# Patient Record
Sex: FEMALE | Race: WHITE | NOT HISPANIC OR LATINO | URBAN - METROPOLITAN AREA
[De-identification: names, ages, dates, MRNs, and addresses within clinical notes are randomized per-mention and may not be internally consistent; named-entity substitution may affect disease eponyms.]

---

## 2024-03-22 PROBLEM — Z00.00 ENCOUNTER FOR PREVENTIVE HEALTH EXAMINATION: Status: ACTIVE | Noted: 2024-03-22

## 2024-03-26 ENCOUNTER — OUTPATIENT (OUTPATIENT)
Dept: OUTPATIENT SERVICES | Facility: HOSPITAL | Age: 64
LOS: 1 days | End: 2024-03-26

## 2024-03-26 VITALS
RESPIRATION RATE: 14 BRPM | HEIGHT: 70 IN | TEMPERATURE: 97 F | WEIGHT: 212.08 LBS | DIASTOLIC BLOOD PRESSURE: 84 MMHG | SYSTOLIC BLOOD PRESSURE: 140 MMHG | OXYGEN SATURATION: 98 % | HEART RATE: 68 BPM

## 2024-03-26 DIAGNOSIS — Z41.1 ENCOUNTER FOR COSMETIC SURGERY: ICD-10-CM

## 2024-03-26 DIAGNOSIS — N62 HYPERTROPHY OF BREAST: ICD-10-CM

## 2024-03-26 DIAGNOSIS — Z90.49 ACQUIRED ABSENCE OF OTHER SPECIFIED PARTS OF DIGESTIVE TRACT: Chronic | ICD-10-CM

## 2024-03-26 RX ORDER — SODIUM CHLORIDE 9 MG/ML
1000 INJECTION, SOLUTION INTRAVENOUS
Refills: 0 | Status: DISCONTINUED | OUTPATIENT
Start: 2024-03-29 | End: 2024-03-30

## 2024-03-26 RX ORDER — DOCUSATE SODIUM 100 MG
1 CAPSULE ORAL
Refills: 0 | DISCHARGE

## 2024-03-26 NOTE — H&P PST ADULT - PROBLEM SELECTOR PLAN 1
**** Panniculus and hypertrophy of breast  Patient is tentatively scheduled for breast reduction, abdominoplasty with Dr Jay 03/29/24.    Pre-op instructions provided. Pt given verbal and written instructions with teach back on chlorhexidine wash and pepcid. Pt verbalized understanding with return demonstration.      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   requested surgeons office to fax CBC,BMP results to [facility].

## 2024-03-26 NOTE — H&P PST ADULT - GASTROINTESTINAL COMMENTS
pre op diagnosis panniculus patient reports due to excess of lower abdominal skin- plan for abdominoplasty

## 2024-03-26 NOTE — H&P PST ADULT - GENITOURINARY COMMENTS
pre op hypertrophy of breast reports she had her pre op blood works and urine tests done in an urgent care - [place]- prescribed Macrobid and completed it 03/18/24. asymptomatic now

## 2024-03-26 NOTE — H&P PST ADULT - NSICDXPASTMEDICALHX_GEN_ALL_CORE_FT
PAST MEDICAL HISTORY:  Gall bladder stones     History of pancreatitis     Urinary tract infection

## 2024-03-26 NOTE — H&P PST ADULT - NSICDXFAMILYHX_GEN_ALL_CORE_FT
FAMILY HISTORY:  Mother  Still living? Unknown  Family history of cardiomyopathy, Age at diagnosis: Age Unknown    Sibling  Still living? Unknown  Family history of cardiomyopathy, Age at diagnosis: Age Unknown

## 2024-03-26 NOTE — H&P PST ADULT - HISTORY OF PRESENT ILLNESS
63 year old female, presents to [facility], with pre op diagnosis of  hypertrophy of breast and panniculus, for pre op evaluation prior to scheduled surgery- breast reduction, abdominoplasty with Dr Jay.

## 2024-03-26 NOTE — H&P PST ADULT - RESPIRATORY
clear to auscultation bilaterally/no wheezes/no rales/no rhonchi/no respiratory distress/airway patent

## 2024-03-26 NOTE — H&P PST ADULT - ADDITIONAL PE
denies loose teeth or dentures- veneers- present- upper front area  complete visualization of uvula- class 2- mallampati

## 2024-03-28 NOTE — ASU PATIENT PROFILE, ADULT - TEACHING/LEARNING CULTURAL CONSIDERATIONS
Contacted patient with results. Verbalized understanding. No questions at this time. Script sent to the pharmacy.    none

## 2024-03-28 NOTE — ASU PATIENT PROFILE, ADULT - FALL HARM RISK - UNIVERSAL INTERVENTIONS
Bed in lowest position, wheels locked, appropriate side rails in place/Call bell, personal items and telephone in reach/Instruct patient to call for assistance before getting out of bed or chair/Non-slip footwear when patient is out of bed/[place] to call system/Physically safe environment - no spills, clutter or unnecessary equipment/Purposeful Proactive Rounding/Room/bathroom lighting operational, light cord in reach

## 2024-03-29 ENCOUNTER — INPATIENT (INPATIENT)
Facility: HOSPITAL | Age: 64
LOS: 0 days | Discharge: HOME | End: 2024-03-30
Attending: SURGERY | Admitting: SURGERY
Payer: COMMERCIAL

## 2024-03-29 ENCOUNTER — RESULT REVIEW (OUTPATIENT)
Age: 64
End: 2024-03-29

## 2024-03-29 VITALS
TEMPERATURE: 98 F | SYSTOLIC BLOOD PRESSURE: 148 MMHG | HEART RATE: 62 BPM | WEIGHT: 212.08 LBS | HEIGHT: 70 IN | OXYGEN SATURATION: 99 % | RESPIRATION RATE: 62 BRPM

## 2024-03-29 DIAGNOSIS — Z41.1 ENCOUNTER FOR COSMETIC SURGERY: ICD-10-CM

## 2024-03-29 DIAGNOSIS — Z90.49 ACQUIRED ABSENCE OF OTHER SPECIFIED PARTS OF DIGESTIVE TRACT: Chronic | ICD-10-CM

## 2024-03-29 PROCEDURE — 88305 TISSUE EXAM BY PATHOLOGIST: CPT | Mod: 26

## 2024-03-29 PROCEDURE — 19318 BREAST REDUCTION: CPT | Mod: 50

## 2024-03-29 PROCEDURE — 15830 EXC EXCESSIVE SKIN ABDOMEN: CPT

## 2024-03-29 RX ORDER — ALBUMIN HUMAN 25 %
250 VIAL (ML) INTRAVENOUS ONCE
Refills: 0 | Status: COMPLETED | OUTPATIENT
Start: 2024-03-29 | End: 2024-03-29

## 2024-03-29 RX ORDER — SENNA PLUS 8.6 MG/1
2 TABLET ORAL AT BEDTIME
Refills: 0 | Status: DISCONTINUED | OUTPATIENT
Start: 2024-03-29 | End: 2024-03-30

## 2024-03-29 RX ORDER — TRAMADOL HYDROCHLORIDE 50 MG/1
25 TABLET ORAL ONCE
Refills: 0 | Status: DISCONTINUED | OUTPATIENT
Start: 2024-03-29 | End: 2024-03-29

## 2024-03-29 RX ORDER — ONDANSETRON 8 MG/1
4 TABLET, FILM COATED ORAL ONCE
Refills: 0 | Status: DISCONTINUED | OUTPATIENT
Start: 2024-03-29 | End: 2024-03-30

## 2024-03-29 RX ORDER — HYDROMORPHONE HYDROCHLORIDE 2 MG/ML
0.5 INJECTION INTRAMUSCULAR; INTRAVENOUS; SUBCUTANEOUS
Refills: 0 | Status: DISCONTINUED | OUTPATIENT
Start: 2024-03-29 | End: 2024-03-30

## 2024-03-29 RX ORDER — ACETAMINOPHEN 500 MG
1000 TABLET ORAL EVERY 6 HOURS
Refills: 0 | Status: DISCONTINUED | OUTPATIENT
Start: 2024-03-29 | End: 2024-03-30

## 2024-03-29 RX ORDER — METOCLOPRAMIDE HCL 10 MG
10 TABLET ORAL EVERY 8 HOURS
Refills: 0 | Status: DISCONTINUED | OUTPATIENT
Start: 2024-03-29 | End: 2024-03-30

## 2024-03-29 RX ORDER — CEFAZOLIN SODIUM 1 G
2000 VIAL (EA) INJECTION EVERY 8 HOURS
Refills: 0 | Status: DISCONTINUED | OUTPATIENT
Start: 2024-03-30 | End: 2024-03-30

## 2024-03-29 RX ORDER — OXYCODONE HYDROCHLORIDE 5 MG/1
10 TABLET ORAL EVERY 4 HOURS
Refills: 0 | Status: DISCONTINUED | OUTPATIENT
Start: 2024-03-29 | End: 2024-03-30

## 2024-03-29 RX ORDER — OXYCODONE HYDROCHLORIDE 5 MG/1
5 TABLET ORAL EVERY 4 HOURS
Refills: 0 | Status: DISCONTINUED | OUTPATIENT
Start: 2024-03-29 | End: 2024-03-30

## 2024-03-29 RX ORDER — PHENYLEPHRINE HYDROCHLORIDE 10 MG/ML
1 INJECTION INTRAVENOUS
Qty: 40 | Refills: 0 | Status: DISCONTINUED | OUTPATIENT
Start: 2024-03-29 | End: 2024-03-30

## 2024-03-29 RX ORDER — OXYCODONE HYDROCHLORIDE 5 MG/1
5 TABLET ORAL ONCE
Refills: 0 | Status: DISCONTINUED | OUTPATIENT
Start: 2024-03-29 | End: 2024-03-30

## 2024-03-29 RX ORDER — DIPHENHYDRAMINE HCL 50 MG
25 CAPSULE ORAL EVERY 4 HOURS
Refills: 0 | Status: DISCONTINUED | OUTPATIENT
Start: 2024-03-29 | End: 2024-03-30

## 2024-03-29 RX ORDER — ACETAMINOPHEN 500 MG
1000 TABLET ORAL ONCE
Refills: 0 | Status: DISCONTINUED | OUTPATIENT
Start: 2024-03-29 | End: 2024-03-30

## 2024-03-29 RX ORDER — ACETAMINOPHEN 500 MG
975 TABLET ORAL EVERY 8 HOURS
Refills: 0 | Status: DISCONTINUED | OUTPATIENT
Start: 2024-03-29 | End: 2024-03-30

## 2024-03-29 RX ORDER — ACETAMINOPHEN 500 MG
1000 TABLET ORAL ONCE
Refills: 0 | Status: COMPLETED | OUTPATIENT
Start: 2024-03-29 | End: 2024-03-29

## 2024-03-29 RX ORDER — ONDANSETRON 8 MG/1
4 TABLET, FILM COATED ORAL EVERY 6 HOURS
Refills: 0 | Status: DISCONTINUED | OUTPATIENT
Start: 2024-03-29 | End: 2024-03-30

## 2024-03-29 RX ORDER — HYDRALAZINE HCL 50 MG
10 TABLET ORAL EVERY 4 HOURS
Refills: 0 | Status: DISCONTINUED | OUTPATIENT
Start: 2024-03-29 | End: 2024-03-30

## 2024-03-29 RX ORDER — CALCIUM CARBONATE 500(1250)
1 TABLET ORAL EVERY 4 HOURS
Refills: 0 | Status: DISCONTINUED | OUTPATIENT
Start: 2024-03-29 | End: 2024-03-30

## 2024-03-29 RX ORDER — LANOLIN ALCOHOL/MO/W.PET/CERES
3 CREAM (GRAM) TOPICAL AT BEDTIME
Refills: 0 | Status: DISCONTINUED | OUTPATIENT
Start: 2024-03-29 | End: 2024-03-30

## 2024-03-29 RX ORDER — HYDROMORPHONE HYDROCHLORIDE 2 MG/ML
0.25 INJECTION INTRAMUSCULAR; INTRAVENOUS; SUBCUTANEOUS
Refills: 0 | Status: DISCONTINUED | OUTPATIENT
Start: 2024-03-29 | End: 2024-03-30

## 2024-03-29 RX ORDER — DIAZEPAM 5 MG
5 TABLET ORAL EVERY 8 HOURS
Refills: 0 | Status: DISCONTINUED | OUTPATIENT
Start: 2024-03-29 | End: 2024-03-30

## 2024-03-29 RX ADMIN — PHENYLEPHRINE HYDROCHLORIDE 36.1 MICROGRAM(S)/KG/MIN: 10 INJECTION INTRAVENOUS at 20:25

## 2024-03-29 RX ADMIN — Medication 400 MILLIGRAM(S): at 23:11

## 2024-03-29 RX ADMIN — TRAMADOL HYDROCHLORIDE 25 MILLIGRAM(S): 50 TABLET ORAL at 22:15

## 2024-03-29 RX ADMIN — TRAMADOL HYDROCHLORIDE 25 MILLIGRAM(S): 50 TABLET ORAL at 21:47

## 2024-03-29 RX ADMIN — Medication 125 MILLILITER(S): at 20:15

## 2024-03-29 RX ADMIN — Medication 1000 MILLIGRAM(S): at 23:59

## 2024-03-29 RX ADMIN — SENNA PLUS 2 TABLET(S): 8.6 TABLET ORAL at 21:48

## 2024-03-29 NOTE — ASU PREOP CHECKLIST - 1.
How Severe Is Your Acne?: moderate Is This A New Presentation, Or A Follow-Up?: Acne Females Only: When Was Your Last Menstrual Period?: 05/10/201 [facility]

## 2024-03-30 ENCOUNTER — TRANSCRIPTION ENCOUNTER (OUTPATIENT)
Age: 64
End: 2024-03-30

## 2024-03-30 VITALS
TEMPERATURE: 99 F | OXYGEN SATURATION: 97 % | SYSTOLIC BLOOD PRESSURE: 117 MMHG | RESPIRATION RATE: 20 BRPM | DIASTOLIC BLOOD PRESSURE: 57 MMHG | HEART RATE: 74 BPM

## 2024-03-30 LAB
HCT VFR BLD CALC: 30.8 % — LOW (ref 34.5–45)
HGB BLD-MCNC: 10.4 G/DL — LOW (ref 11.5–15.5)
MCHC RBC-ENTMCNC: 29.9 PG — SIGNIFICANT CHANGE UP (ref 27–34)
MCHC RBC-ENTMCNC: 33.8 GM/DL — SIGNIFICANT CHANGE UP (ref 32–36)
MCV RBC AUTO: 88.5 FL — SIGNIFICANT CHANGE UP (ref 80–100)
NRBC # BLD: 0 /100 WBCS — SIGNIFICANT CHANGE UP (ref 0–0)
NRBC # FLD: 0 K/UL — SIGNIFICANT CHANGE UP (ref 0–0)
PLATELET # BLD AUTO: 188 K/UL — SIGNIFICANT CHANGE UP (ref 150–400)
RBC # BLD: 3.48 M/UL — LOW (ref 3.8–5.2)
RBC # FLD: 12.3 % — SIGNIFICANT CHANGE UP (ref 10.3–14.5)
WBC # BLD: 14.82 K/UL — HIGH (ref 3.8–10.5)
WBC # FLD AUTO: 14.82 K/UL — HIGH (ref 3.8–10.5)

## 2024-03-30 RX ORDER — INFLUENZA VIRUS VACCINE 15; 15; 15; 15 UG/.5ML; UG/.5ML; UG/.5ML; UG/.5ML
0.5 SUSPENSION INTRAMUSCULAR ONCE
Refills: 0 | Status: DISCONTINUED | OUTPATIENT
Start: 2024-03-30 | End: 2024-03-30

## 2024-03-30 RX ORDER — SODIUM CHLORIDE 9 MG/ML
500 INJECTION, SOLUTION INTRAVENOUS ONCE
Refills: 0 | Status: COMPLETED | OUTPATIENT
Start: 2024-03-30 | End: 2024-03-30

## 2024-03-30 RX ADMIN — Medication 400 MILLIGRAM(S): at 06:20

## 2024-03-30 RX ADMIN — Medication 1000 MILLIGRAM(S): at 14:00

## 2024-03-30 RX ADMIN — Medication 100 MILLIGRAM(S): at 01:01

## 2024-03-30 RX ADMIN — Medication 100 MILLIGRAM(S): at 09:20

## 2024-03-30 RX ADMIN — Medication 100 MILLIGRAM(S): at 17:07

## 2024-03-30 RX ADMIN — SODIUM CHLORIDE 1000 MILLILITER(S): 9 INJECTION, SOLUTION INTRAVENOUS at 06:34

## 2024-03-30 RX ADMIN — SODIUM CHLORIDE 1000 MILLILITER(S): 9 INJECTION, SOLUTION INTRAVENOUS at 02:06

## 2024-03-30 RX ADMIN — Medication 400 MILLIGRAM(S): at 18:16

## 2024-03-30 RX ADMIN — Medication 400 MILLIGRAM(S): at 13:14

## 2024-03-30 NOTE — BRIEF OPERATIVE NOTE - NSICDXBRIEFPROCEDURE_GEN_ALL_CORE_FT
PROCEDURES:  Breast reduction 30-Mar-2024 07:15:01  Candice Jovel  Abdominoplasty, with liposuction if indicated 30-Mar-2024 07:15:44  Candice Jovel

## 2024-03-30 NOTE — BRIEF OPERATIVE NOTE - OPERATION/FINDINGS
bilateral breast reduction with inferior pedicle, abdominoplasty with undermining to xiphoid- no plication, flank liposuction with tumescent fluid

## 2024-03-30 NOTE — CHART NOTE - NSCHARTNOTEFT_GEN_A_CORE
Pt is a 63 year old female presenting today  for b/L breast reduction and abdominoplasty. Intraop and post op course c/b hypotension requiring phenylephrine gtt. Pt received 2L LR, albumin5% 500 cc  with inability to wean to meet goal -140. AS per anesthesia, pt to remain in PACU overnight with attempt to wean pressor by AM. No SICU consult and  no labs desired at this time. I/O and drains to be closely measured.     ICU Vital Signs Last 24 Hrs  T(C): 36.5 (30 Mar 2024 00:00), Max: 36.9 (29 Mar 2024 19:45)  T(F): 97.7 (30 Mar 2024 00:00), Max: 98.4 (29 Mar 2024 19:45)  HR: 81 (30 Mar 2024 00:00) (62 - 93)  BP: 119/71 (30 Mar 2024 00:00) (91/60 - 148/-)  BP(mean): 82 (30 Mar 2024 00:00) (60 - 95)  ABP: --  ABP(mean): --  RR: 14 (30 Mar 2024 00:00) (12 - 62)  SpO2: 93% (30 Mar 2024 00:00) (93% - 99%)    O2 Parameters below as of 30 Mar 2024 00:00  Patient On (Oxygen Delivery Method): room air

## 2024-03-30 NOTE — DISCHARGE NOTE PROVIDER - NSDCCPCAREPLAN_GEN_ALL_CORE_FT
PRINCIPAL DISCHARGE DIAGNOSIS  Diagnosis: Hypertrophy of breast  Assessment and Plan of Treatment:

## 2024-03-30 NOTE — DISCHARGE NOTE NURSING/CASE MANAGEMENT/SOCIAL WORK - NSDCPNINST_GEN_ALL_CORE
Notify MD if experiencing fever, nausea, vomiting, increased pain, bleeding, drainage or odor from incision. Empty drains and record output.

## 2024-03-30 NOTE — DISCHARGE NOTE PROVIDER - NSDCFUADDINST_GEN_ALL_CORE_FT
Please follow all instructions as provided by Dr. Jay  Please take all medications as prescribed by Dr. Jay

## 2024-03-30 NOTE — DISCHARGE NOTE NURSING/CASE MANAGEMENT/SOCIAL WORK - PATIENT PORTAL LINK FT
You can access the FollowMyHealth Patient Portal offered by [facility] by registering at the following website: [email or web address]. By joining [employer]’s FollowMyHealth portal, you will also be able to view your health information using other applications (apps) compatible with our system.

## 2024-03-30 NOTE — PROGRESS NOTE ADULT - ASSESSMENT
63F s/p breast reduction and abdominoplasty on 3/29    -Weaned off pressors  -Ok for floor  -Local wound care  -No NSAIDs  -Abx  -No DVT ppx  -D/c FRANCOISE carolina  Plastic Surgery Resident PGY-2  [facility]: [phone]  [facility]: [id number]  Available on Microsoft Teams

## 2024-03-30 NOTE — PROGRESS NOTE ADULT - SUBJECTIVE AND OBJECTIVE BOX
Plastic Surgery Progress Note (pg [facility]: [id number], [facility]: [phone])    SUBJECTIVE  The patient was seen and examined. On pressors overnight, weaned this AM. Feeling well this AM.    OBJECTIVE  ___________________________________________________  VITAL SIGNS / I&O's   Vital Signs Last 24 Hrs  T(C): 36.5 (30 Mar 2024 07:00), Max: 36.9 (29 Mar 2024 19:45)  T(F): 97.7 (30 Mar 2024 07:00), Max: 98.4 (29 Mar 2024 19:45)  HR: 76 (30 Mar 2024 08:00) (62 - 96)  BP: 102/61 (30 Mar 2024 08:00) (88/50 - 135/79)  BP(mean): 71 (30 Mar 2024 08:00) (59 - 95)  RR: 14 (30 Mar 2024 08:00) (10 - 23)  SpO2: 100% (30 Mar 2024 08:00) (92% - 100%)    Parameters below as of 30 Mar 2024 08:00  Patient On (Oxygen Delivery Method): room air          29 Mar 2024 07:01  -  30 Mar 2024 07:00  --------------------------------------------------------  IN:    IV PiggyBack: 1250 mL    Lactated Ringers: 360 mL    Oral Fluid: 1170 mL    Phenylephrine: 61.2 mL  Total IN: 2841.2 mL    OUT:    Bulb (mL): 36 mL    Bulb (mL): 57 mL    Indwelling Catheter - Urethral (mL): 2720 mL  Total OUT: 2813 mL    Total NET: 28.2 mL      30 Mar 2024 07:01  -  30 Mar 2024 08:41  --------------------------------------------------------  IN:    Lactated Ringers: 30 mL    Oral Fluid: 200 mL  Total IN: 230 mL    OUT:    Indwelling Catheter - Urethral (mL): 165 mL    Phenylephrine: 0 mL  Total OUT: 165 mL    Total NET: 65 mL        ___________________________________________________  PHYSICAL EXAM    -- CONSTITUTIONAL: NAD, lying in bed  -- NEURO: Awake, alert  -- Breasts: soft, incisions with minimal strikethrough, no evidence of hematoma. Left breast with lateral bruising.   -- Abdomen: Incision c/d/i, no collections or hematoma on exam. DEVIN drains ss.     ___________________________________________________  LABS                        10.4   14.82 )-----------( 188      ( 30 Mar 2024 02:20 )             30.8             CAPILLARY BLOOD GLUCOSE              ___________________________________________________  MICRO  Recent Cultures:    ___________________________________________________  MEDICATIONS  (STANDING):  acetaminophen     Tablet .. 975 milliGRAM(s) Oral every 8 hours  acetaminophen   IVPB .. 1000 milliGRAM(s) IV Intermittent every 6 hours  ceFAZolin   IVPB 2000 milliGRAM(s) IV Intermittent every 8 hours  influenza   Vaccine 0.5 milliLiter(s) IntraMuscular once  lactated ringers. 1000 milliLiter(s) (30 mL/Hr) IV Continuous <Continuous>  senna 2 Tablet(s) Oral at bedtime    MEDICATIONS  (PRN):  acetaminophen   IVPB .. 1000 milliGRAM(s) IV Intermittent once PRN Mild Pain (1 - 3)  aluminum hydroxide/magnesium hydroxide/simethicone Suspension 30 milliLiter(s) Oral every 4 hours PRN Dyspepsia  calcium carbonate    500 mG (Tums) Chewable 1 Tablet(s) Chew every 4 hours PRN Dyspepsia  diazepam    Tablet 5 milliGRAM(s) Oral every 8 hours PRN muscle spasms  diphenhydrAMINE 25 milliGRAM(s) Oral every 4 hours PRN Itching  hydrALAZINE 10 milliGRAM(s) Oral every 4 hours PRN Systolic blood pressure > 140  HYDROmorphone  Injectable 0.5 milliGRAM(s) IV Push every 10 minutes PRN Moderate Pain (4 - 6)  HYDROmorphone  Injectable 0.25 milliGRAM(s) IV Push every 10 minutes PRN Severe Pain (7 - 10)  melatonin 3 milliGRAM(s) Oral at bedtime PRN Insomnia  metoclopramide Injectable 10 milliGRAM(s) IV Push every 8 hours PRN Nausea and/or Vomiting  ondansetron Injectable 4 milliGRAM(s) IV Push every 6 hours PRN Nausea and/or Vomiting  ondansetron Injectable 4 milliGRAM(s) IV Push once PRN Nausea and/or Vomiting  oxyCODONE    IR 10 milliGRAM(s) Oral every 4 hours PRN Severe Pain (7 - 10)  oxyCODONE    IR 5 milliGRAM(s) Oral once PRN Moderate Pain (4 - 6)  oxyCODONE    IR 5 milliGRAM(s) Oral every 4 hours PRN Moderate Pain (4 - 6)

## 2024-03-30 NOTE — DISCHARGE NOTE PROVIDER - CARE PROVIDER_API CALL
Awais Jay  Plastic Surgery  [address] [place] [address], [address]  [place] [address]  Phone: [phone]  Fax: [phone]  Follow Up Time: 1 week

## 2024-03-30 NOTE — DISCHARGE NOTE NURSING/CASE MANAGEMENT/SOCIAL WORK - NSDCPEFALRISK_GEN_ALL_CORE
For information on Fall & Injury Prevention, visit: [email or web address] OR  [email or web address] OR  https://www.cdc.gov/steadi/patient.html

## 2024-03-30 NOTE — DISCHARGE NOTE PROVIDER - NSDCCPTREATMENT_GEN_ALL_CORE_FT
PRINCIPAL PROCEDURE  Procedure: Breast reduction  Findings and Treatment:       SECONDARY PROCEDURE  Procedure: Abdominoplasty, with liposuction if indicated  Findings and Treatment:

## 2024-03-30 NOTE — DISCHARGE NOTE PROVIDER - CARE PROVIDERS DIRECT ADDRESSES
[email or web address]

## 2024-03-30 NOTE — DISCHARGE NOTE PROVIDER - HOSPITAL COURSE
63F was admitted to [facility] 3/29/24 . The patient had a breast reduction and abdominoplasty performed in the OR. Post-operatively the patient was sent to the PACU, where she remained overnight on pressors due to persistent hypotension. The patient was weaned off of pressors early POD1 and remained hemodynamically stable. The patient was then sent to a surgical floor. 2 drains were placed intraoperatively, which the patient went home with.  The patient's pain was controlled by IV pain medications transitioned to po medications without issue. The patient was advanced to regular diet and tolerated it well. The patient was hemodynamically stable, placed on home medications, and discharged with instructions to follow up with Dr. Jay in 1 week and had no other issues.

## 2024-04-04 LAB — SURGICAL PATHOLOGY STUDY: SIGNIFICANT CHANGE UP

## 2024-04-25 ENCOUNTER — RESULT REVIEW (OUTPATIENT)
Age: 64
End: 2024-04-25

## 2024-04-25 ENCOUNTER — OUTPATIENT (OUTPATIENT)
Dept: OUTPATIENT SERVICES | Facility: HOSPITAL | Age: 64
LOS: 1 days | End: 2024-04-25
Payer: COMMERCIAL

## 2024-04-25 DIAGNOSIS — Z00.00 ENCOUNTER FOR GENERAL ADULT MEDICAL EXAMINATION WITHOUT ABNORMAL FINDINGS: ICD-10-CM

## 2024-04-25 DIAGNOSIS — Z90.49 ACQUIRED ABSENCE OF OTHER SPECIFIED PARTS OF DIGESTIVE TRACT: Chronic | ICD-10-CM

## 2024-04-25 DIAGNOSIS — T81.89XA OTHER COMPLICATIONS OF PROCEDURES, NOT ELSEWHERE CLASSIFIED, INITIAL ENCOUNTER: ICD-10-CM

## 2024-04-25 PROBLEM — N39.0 URINARY TRACT INFECTION, SITE NOT SPECIFIED: Chronic | Status: ACTIVE | Noted: 2024-03-26

## 2024-04-25 PROBLEM — K80.20 CALCULUS OF GALLBLADDER WITHOUT CHOLECYSTITIS WITHOUT OBSTRUCTION: Chronic | Status: ACTIVE | Noted: 2024-03-26

## 2024-04-25 PROBLEM — Z87.19 PERSONAL HISTORY OF OTHER DISEASES OF THE DIGESTIVE SYSTEM: Chronic | Status: ACTIVE | Noted: 2024-03-26

## 2024-04-25 PROCEDURE — 10160 PNXR ASPIR ABSC HMTMA BULLA: CPT

## 2024-04-25 PROCEDURE — 76942 ECHO GUIDE FOR BIOPSY: CPT | Mod: 26

## 2024-04-25 PROCEDURE — 76942 ECHO GUIDE FOR BIOPSY: CPT

## (undated) DEVICE — ELCTR GROUNDING PAD ADULT COVIDIEN

## (undated) DEVICE — DRSG DERMABOND PRINEO 22CM

## (undated) DEVICE — DRAPE 3/4 SHEET 52X76"

## (undated) DEVICE — DRAIN JACKSON PRATT 7MM FLAT FULL NO TROCAR

## (undated) DEVICE — CANISTER DISPOSABLE THIN WALL 3000CC

## (undated) DEVICE — LAP PAD W RING 18 X 18"

## (undated) DEVICE — SUT SILK 2-0 18" FS

## (undated) DEVICE — SUT ETHIBOND 0 30" MP

## (undated) DEVICE — VENODYNE/SCD SLEEVE CALF MEDIUM

## (undated) DEVICE — DRSG STERISTRIPS 0.5 X 4"

## (undated) DEVICE — SUT NYLON 2-0 18" FS

## (undated) DEVICE — STAPLER SKIN VISI-STAT 35 WIDE

## (undated) DEVICE — SUT VICRYL 2-0 27" SH UNDYED

## (undated) DEVICE — DRAPE TOWEL BLUE 17" X 24"

## (undated) DEVICE — SUT MONOCRYL 4-0 27" PS-2 UNDYED

## (undated) DEVICE — SUT QUILL PDO 0 24X24CM

## (undated) DEVICE — ELCTR BOVIE TIP BLADE INSULATED 2.75" EDGE

## (undated) DEVICE — WARMING BLANKET LOWER ADULT

## (undated) DEVICE — BASIN SET DOUBLE

## (undated) DEVICE — ELCTR BOVIE PENCIL SMOKE EVACUATION

## (undated) DEVICE — LABELS BLANK W PEN

## (undated) DEVICE — Device

## (undated) DEVICE — SUT PROLENE 0 30" CT-1

## (undated) DEVICE — SUT VICRYL 4-0 18" P-3 UNDYED

## (undated) DEVICE — ONETRAC LIGHTED RETRACTOR 135 X 30MM DISP

## (undated) DEVICE — SUT VICRYL 0 36" CT-1 UNDYED

## (undated) DEVICE — STAPLER SKIN MULTI DIRECTION W35

## (undated) DEVICE — ABDOMINAL BINDER MED/LG 9" X 36"-64"

## (undated) DEVICE — SUT ETHIBOND 1 30" CT-1

## (undated) DEVICE — FOLEY TRAY 14FR 5CC LF UMETER CLOSED

## (undated) DEVICE — POSITIONER STRAP ARMBOARD VELCRO TS-30

## (undated) DEVICE — BLADE SURGICAL #10 CARBON

## (undated) DEVICE — TUBING LIPOSUCTION

## (undated) DEVICE — DRAIN RESERVOIR FOR JACKSON PRATT 100CC CARDINAL

## (undated) DEVICE — TUBING INFILTRATION

## (undated) DEVICE — ELCTR BOVIE BLADE 3/4" EXTENDED LENGTH 6"

## (undated) DEVICE — DRSG COMBINE 5X9"

## (undated) DEVICE — DRSG TAPE UMBILICAL COTTON 2" X 30 X 1/8"

## (undated) DEVICE — DRAPE LAPAROTOMY TRANSVERSE

## (undated) DEVICE — BLADE SURGICAL #15 CARBON

## (undated) DEVICE — SUT VICRYL 3-0 27" SH UNDYED

## (undated) DEVICE — ONETRAC LIGHTED RETRACTOR 90 X 22MM DISP

## (undated) DEVICE — PACK MAJOR ABDOMINAL WITH LAP

## (undated) DEVICE — TAPE SILK 3"

## (undated) DEVICE — SUT ETHILON 6-0 18" PS-3

## (undated) DEVICE — ELCTR BOVIE PENCIL BLADE 10FT

## (undated) DEVICE — SUT SILK 2-0 18" TIES